# Patient Record
Sex: FEMALE | Race: BLACK OR AFRICAN AMERICAN | NOT HISPANIC OR LATINO | Employment: UNEMPLOYED | URBAN - METROPOLITAN AREA
[De-identification: names, ages, dates, MRNs, and addresses within clinical notes are randomized per-mention and may not be internally consistent; named-entity substitution may affect disease eponyms.]

---

## 2024-10-02 ENCOUNTER — HOSPITAL ENCOUNTER (EMERGENCY)
Facility: HOSPITAL | Age: 59
Discharge: HOME | End: 2024-10-02
Payer: COMMERCIAL

## 2024-10-02 ENCOUNTER — APPOINTMENT (OUTPATIENT)
Dept: RADIOLOGY | Facility: HOSPITAL | Age: 59
End: 2024-10-02
Payer: COMMERCIAL

## 2024-10-02 VITALS
RESPIRATION RATE: 16 BRPM | HEART RATE: 78 BPM | DIASTOLIC BLOOD PRESSURE: 81 MMHG | OXYGEN SATURATION: 98 % | SYSTOLIC BLOOD PRESSURE: 156 MMHG | TEMPERATURE: 97.5 F

## 2024-10-02 DIAGNOSIS — M19.039 ARTHRITIS, WRIST: Primary | ICD-10-CM

## 2024-10-02 PROCEDURE — 99284 EMERGENCY DEPT VISIT MOD MDM: CPT

## 2024-10-02 PROCEDURE — 73110 X-RAY EXAM OF WRIST: CPT | Mod: RT

## 2024-10-02 PROCEDURE — 2500000004 HC RX 250 GENERAL PHARMACY W/ HCPCS (ALT 636 FOR OP/ED): Mod: SE | Performed by: PHYSICIAN ASSISTANT

## 2024-10-02 PROCEDURE — 73130 X-RAY EXAM OF HAND: CPT | Mod: RT

## 2024-10-02 PROCEDURE — 96372 THER/PROPH/DIAG INJ SC/IM: CPT | Performed by: PHYSICIAN ASSISTANT

## 2024-10-02 PROCEDURE — 73130 X-RAY EXAM OF HAND: CPT | Mod: RIGHT SIDE | Performed by: RADIOLOGY

## 2024-10-02 PROCEDURE — 73110 X-RAY EXAM OF WRIST: CPT | Mod: RIGHT SIDE | Performed by: RADIOLOGY

## 2024-10-02 PROCEDURE — 99284 EMERGENCY DEPT VISIT MOD MDM: CPT | Performed by: PHYSICIAN ASSISTANT

## 2024-10-02 PROCEDURE — 2500000001 HC RX 250 WO HCPCS SELF ADMINISTERED DRUGS (ALT 637 FOR MEDICARE OP): Performed by: PHYSICIAN ASSISTANT

## 2024-10-02 RX ORDER — KETOROLAC TROMETHAMINE 30 MG/ML
30 INJECTION, SOLUTION INTRAMUSCULAR; INTRAVENOUS ONCE
Status: COMPLETED | OUTPATIENT
Start: 2024-10-02 | End: 2024-10-02

## 2024-10-02 RX ORDER — GABAPENTIN 300 MG/1
300 CAPSULE ORAL EVERY 8 HOURS SCHEDULED
Status: DISCONTINUED | OUTPATIENT
Start: 2024-10-02 | End: 2024-10-02 | Stop reason: HOSPADM

## 2024-10-02 RX ORDER — KETOROLAC TROMETHAMINE 10 MG/1
10 TABLET, FILM COATED ORAL EVERY 6 HOURS PRN
Qty: 12 TABLET | Refills: 0 | Status: SHIPPED | OUTPATIENT
Start: 2024-10-02 | End: 2024-10-05

## 2024-10-02 RX ADMIN — GABAPENTIN 300 MG: 300 CAPSULE ORAL at 15:15

## 2024-10-02 RX ADMIN — KETOROLAC TROMETHAMINE 30 MG: 30 INJECTION, SOLUTION INTRAMUSCULAR; INTRAVENOUS at 16:03

## 2024-10-02 ASSESSMENT — COLUMBIA-SUICIDE SEVERITY RATING SCALE - C-SSRS
6. HAVE YOU EVER DONE ANYTHING, STARTED TO DO ANYTHING, OR PREPARED TO DO ANYTHING TO END YOUR LIFE?: NO
1. IN THE PAST MONTH, HAVE YOU WISHED YOU WERE DEAD OR WISHED YOU COULD GO TO SLEEP AND NOT WAKE UP?: NO
2. HAVE YOU ACTUALLY HAD ANY THOUGHTS OF KILLING YOURSELF?: NO

## 2024-10-02 NOTE — ED PROVIDER NOTES
HPI   Chief Complaint   Patient presents with    Wrist Pain       HPI: Patient is a 59 year old female with a history of diabetes who presents to the ED for right wrist pain has been ongoing for the past 3 weeks.  Patient is complaining of pain that she states extends from the lateral right wrist and extends into her thumb and second digit.  She describes the pain as a burning/tingling sensation.  States that she does have a history of diabetes and has neuropathy in her feet.  States that this sensation feels somewhat similar.  She denies any trauma or injury.  Denies any repetitive motions of the hands.  She is right-handed.  ------------------------------------------------------------------------------------------------------------------------------------------  ROS: a ten point review of systems was performed and was negative except as per HPI.  ------------------------------------------------------------------------------------------------------------------------------------------  PMH / PSH: as per HPI, otherwise reviewed   MEDS: as per HPI, otherwise reviewed in EMR  ALLERGIES: as per HPI, otherwise reviewed in EMR  SocH:  as per HPI, otherwise reviewed in EMR  FH:  as per HPI, otherwise reviewed in EMR   ------------------------------------------------------------------------------------------------------------------------------------------  Physical Exam:  VS: As documented in the triage note and EMR flowsheet from this visit was reviewed  General: Well appearing. No acute distress.   Eyes:  Extraocular movements grossly intact. No scleral icterus.   Head: Atraumatic. Normocephalic.     Neck: No meningismus. No gross masses. Full movement through range of motion  CV: Regular rhythm. No murmurs, rubs, gallops appreciated.   Resp: Clear to auscultation bilaterally. No respiratory distress.    GI: Nontender. Soft. No masses. No rebound, rigidity or guarding.   MSK: Symmetric muscle bulk. No gross step offs or  deformities. No obvious tenderness to the right hand.   Skin: Warm, dry. No rashes  Neuro: CN II-VII intact. A&O x3. Speech fluent. Alert. Moving all extremities. Ambulates with normal gait  Psych: Appropriate mood and affect for situation  ------------------------------------------------------------------------------------------------------------------------------------------  Hospital Course / Medical Decision Making:Patient is a 59 year old female who presents to the ED for right hand pain that has been ongoing for three weeks. Patient denies any injury or repetitive motion. On examination the wrist is of normal appearance and nontender. 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 On reassessment patient was still having pain and therefore was administered Toradol. Provided a Rx for NSAIDs. Given a wrist brace per her request. As a result of the work-up, the patient was discharged home in stable condition.  They were informed of the diagnosis and instructed to come back with any concerns or worsening of condition.  Agreeable to the plan as discussed above.  Patient given the opportunity to ask questions.  All of the patient's questions were answered.                   Patient History   No past medical history on file.  No past surgical history on file.  No family history on file.  Social History     Tobacco Use    Smoking status: Not on file    Smokeless tobacco: Not on file   Substance Use Topics    Alcohol use: Not on file    Drug use: Not on file       Physical Exam   ED Triage Vitals [10/02/24 1409]   Temperature Heart Rate Respirations BP   36.4 °C (97.5 °F) 78 16 156/81      Pulse Ox Temp src Heart Rate Source Patient Position   98 % -- Monitor Sitting      BP Location FiO2 (%)     Left arm --       Physical Exam      ED Course & MDM   Diagnoses as of  10/02/24 1612   Arthritis, wrist                 No data recorded     [place] Coma Scale Score: 15 (10/02/24 1419 : CALIXTO Thompson-RACHAEL)                           Medical Decision Making      Procedure  Procedures     Daniela Coburn PA-C  10/02/24 1611

## 2024-11-19 ENCOUNTER — OFFICE VISIT (OUTPATIENT)
Dept: ORTHOPEDIC SURGERY | Facility: HOSPITAL | Age: 59
End: 2024-11-19
Payer: COMMERCIAL

## 2024-11-19 DIAGNOSIS — M25.531 RIGHT WRIST PAIN: Primary | ICD-10-CM

## 2024-11-19 PROCEDURE — 20550 NJX 1 TENDON SHEATH/LIGAMENT: CPT | Performed by: STUDENT IN AN ORGANIZED HEALTH CARE EDUCATION/TRAINING PROGRAM

## 2024-11-19 PROCEDURE — 99214 OFFICE O/P EST MOD 30 MIN: CPT | Mod: 25 | Performed by: STUDENT IN AN ORGANIZED HEALTH CARE EDUCATION/TRAINING PROGRAM

## 2024-11-19 PROCEDURE — 99204 OFFICE O/P NEW MOD 45 MIN: CPT | Performed by: STUDENT IN AN ORGANIZED HEALTH CARE EDUCATION/TRAINING PROGRAM

## 2024-11-19 ASSESSMENT — PAIN SCALES - GENERAL: PAINLEVEL_OUTOF10: 9

## 2024-11-19 ASSESSMENT — PAIN DESCRIPTION - DESCRIPTORS: DESCRIPTORS: ACHING;NUMBNESS

## 2024-11-19 ASSESSMENT — PAIN - FUNCTIONAL ASSESSMENT: PAIN_FUNCTIONAL_ASSESSMENT: 0-10

## 2024-11-21 PROCEDURE — L3809 WHFO W/O JOINTS PRE OTS: HCPCS | Performed by: STUDENT IN AN ORGANIZED HEALTH CARE EDUCATION/TRAINING PROGRAM

## 2024-11-21 NOTE — PROGRESS NOTES
CHIEF COMPLAINT: Bilateral radial wrist pain, right worse than left  DOI none  DOS: None      HISTORY OF PRESENT ILLNESS    This is a very pleasant 59-year-old right-hand-dominant female does not work, lives with her son, denies active tobacco use, positive type 2 diabetes on insulin, denies anticoagulation significant past medical history denies significant surgical history.  She is presenting as a new patient for evaluation of right greater than left radial sided wrist pain.  This been going on for over 1 month.  She denies any precipitating event or known trauma.  Pain is focal over the radial styloid.  Worse with thumb movement.  Sometimes radiates down into her thumb.  Denies any component of numbness tingling paresthesias.  She has been wearing a removable thumb spica brace to the right side without much benefit.  She has not immobilized the left side at all yet.    PHYSICAL EXAM    Extremities / Musculoskeletal:                  Bilateral upper extremities:  No gross deformity no active skin lesions open wounds no erythema edema or ecchymoses.  Focally tender palpation first dorsal compartment.  Strongly positive Eickhoff maneuver.  Nontender palpation of the base of the thumb.  No A1 pulley tenderness.  Full wrist range of motion.  Motor intact radial ulnar median AIN PIN.  Sensation tact light touch radial ulnar median.  2+ radial warm well-perfused      IMAGING / LABS / EMGs:    Right hand and right wrist x-ray series obtained on 10/2/2024 independently reviewed demonstrate normal global alignment no signs of acute displaced fracture or dislocation.  Age-appropriate degenerative changes.    No past medical history on file.    Medication Documentation Review Audit       Reviewed by Jailene Cummings MA (Medical Assistant) on 11/19/24 at 1003      Medication Order Taking? Sig Documenting Provider Last Dose Status            No Medications to Display                                   Allergies   Allergen  Reactions    Lisinopril Cough and Swelling       No past surgical history on file.      ASSESSMENT:   Bilateral de Quervain's tenosynovitis right greater than left    We discussed my assessment as well as the available treatment options.  Given the fact that her left side is treatment naïve I recommend starting with removable thumb spica bracing.  For her right side she is significantly tender despite over 1 month of compliance with bracing.  I think it be reasonable consider a corticosteroid injection into the first dorsal compartment.  We discussed the risks and benefits of this procedure including the potential for having permanent skin hypopigmentation and subcutaneous fatty atrophy.  The patient clearly understands the clinical scenario and elected to proceed with a right sided first dorsal extensor compartment injection    Procedure:  1 cc consisting of equal parts 1% lidocaine and 40 mg/mL Kenalog was injected in the first dorsal extensor compartment of the right side at the level of the radial styloid.  This was done in the usual sterile conditions.  Patient tolerated the procedure well    PLAN:   Left thumb spica fitted by [employer]  To be worn is much as possible  Right first dorsal extensor compartment injection  Continue wearing right thumb spica brace    Follow-up in: I like to see the patient back in 1 month to monitor the effects of the steroid injection as well as trochanter on the left side  XR at next visit: Yes left wrist x-ray series      Marvin Bullock M.D.    Department of Orthopaedics  Hand/Upper Extremity  Phone: [phone]  Appt. Phone: [phone]

## 2024-12-17 ENCOUNTER — APPOINTMENT (OUTPATIENT)
Dept: ORTHOPEDIC SURGERY | Facility: HOSPITAL | Age: 59
End: 2024-12-17
Payer: COMMERCIAL

## 2024-12-17 DIAGNOSIS — M25.532 WRIST PAIN, ACUTE, LEFT: ICD-10-CM

## 2025-01-21 ENCOUNTER — APPOINTMENT (OUTPATIENT)
Dept: ORTHOPEDIC SURGERY | Facility: HOSPITAL | Age: 60
End: 2025-01-21
Payer: COMMERCIAL

## 2025-01-21 DIAGNOSIS — M25.532 WRIST PAIN, ACUTE, LEFT: ICD-10-CM

## 2025-02-04 ENCOUNTER — OFFICE VISIT (OUTPATIENT)
Dept: ORTHOPEDIC SURGERY | Facility: HOSPITAL | Age: 60
End: 2025-02-04
Payer: COMMERCIAL

## 2025-02-04 ENCOUNTER — HOSPITAL ENCOUNTER (OUTPATIENT)
Dept: RADIOLOGY | Facility: HOSPITAL | Age: 60
Discharge: HOME | End: 2025-02-04
Payer: COMMERCIAL

## 2025-02-04 DIAGNOSIS — M25.532 LEFT WRIST PAIN: ICD-10-CM

## 2025-02-04 PROCEDURE — 73110 X-RAY EXAM OF WRIST: CPT | Mod: LT

## 2025-02-04 PROCEDURE — 99214 OFFICE O/P EST MOD 30 MIN: CPT | Mod: 25 | Performed by: STUDENT IN AN ORGANIZED HEALTH CARE EDUCATION/TRAINING PROGRAM

## 2025-02-04 PROCEDURE — 99214 OFFICE O/P EST MOD 30 MIN: CPT | Performed by: STUDENT IN AN ORGANIZED HEALTH CARE EDUCATION/TRAINING PROGRAM

## 2025-02-04 PROCEDURE — 20605 DRAIN/INJ JOINT/BURSA W/O US: CPT | Performed by: STUDENT IN AN ORGANIZED HEALTH CARE EDUCATION/TRAINING PROGRAM

## 2025-02-04 NOTE — PROGRESS NOTES
CHIEF COMPLAINT: Bilateral radial wrist pain, right worse than left  DOI none  DOS: None      HISTORY OF PRESENT ILLNESS    This is a very pleasant 59-year-old right-hand-dominant female does not work, lives with her son, denies active tobacco use, positive type 2 diabetes on insulin, denies anticoagulation significant past medical history denies significant surgical history.  She is presenting as a new patient for evaluation of right greater than left radial sided wrist pain.  This been going on for over 1 month.  She denies any precipitating event or known trauma.  Pain is focal over the radial styloid.  Worse with thumb movement.  Sometimes radiates down into her thumb.  Denies any component of numbness tingling paresthesias.  She has been wearing a removable thumb spica brace to the right side without much benefit.  She has not immobilized the left side at all yet.    Interval update 2/4/2025:  Patient return for scheduled follow-up.  She has done remarkably well with the right-sided injection she has had complete resolution of her symptoms.  Unfortunately despite thumb spica bracing her left radial wrist is severely painful specially with any attempted motions of her thumb.    PHYSICAL EXAM    Extremities / Musculoskeletal:                  Bilateral upper extremities:  No gross deformity no active skin lesions open wounds no erythema edema or ecchymoses.  Focally tender palpation first dorsal compartment on the left side with associated swelling.  Right side completely nontender to palpation..  Strongly positive Eickhoff maneuver on the left side, right side negative.  Nontender palpation of the base of the thumb.  No A1 pulley tenderness.  Full wrist range of motion.  Motor intact radial ulnar median AIN PIN.  Sensation tact light touch radial ulnar median.  2+ radial warm well-perfused      IMAGING / LABS / EMGs:    Right hand and right wrist x-ray series obtained on 10/2/2024 independently reviewed  demonstrate normal global alignment no signs of acute displaced fracture or dislocation.  Age-appropriate degenerative changes.    Left wrist x-ray series obtained on 2/4/2025 independent reviewed demonstrate normal global alignment no signs of acute fracture or dislocation, age-appropriate degenerative changes.      ASSESSMENT:   Bilateral de Quervain's tenosynovitis now left greater than right     We long discussion regarding my assessment.  Fortunately the prior corticosteroid injection for her right de Quervain's tenosynovitis worked very well and she has had complete durable resolution of her symptoms.  Her left side is very aggravated now despite thumb spica bracing.  I think it would be appropriate to consider a corticosteroid injection today in the office.    We discussed the risks of the corticosteroid injection including but not limited to the theoretical risk of infection.  The small risk of skin hypopigmentation and fatty atrophy which could be permanent.  We also discussed the possibility of a transient increase in blood glucose levels.  We discussed that they should monitor her blood glucose closely over the next few days.  If it is greater than 400 that she call her PCP and if it is greater than 500 they should present to the emergency department.    Stands the clinical scenario.  There are no identified barriers to understanding.  All questions were answered.  The patient elected to proceed with an injection of the left first dorsal extensor compartment for left-sided de Quervain's tenosynovitis    Procedure:  1 cc consisting of equal parts 1% lidocaine and 40 mg/mL Kenalog was injected in the first dorsal extensor compartment of the left side at the level of the radial styloid.  This was done in the usual sterile conditions.  Patient tolerated the procedure well    PLAN:   Left thumb spica as needed  No precautions on the right side     Follow-up in: Although we do not need a scheduled follow-up  visit at this time, I encouraged the patient to return to clinic if they have any concerns or issues whatsoever. The patient was provided with my office's contact information.     XR at next visit: none      Marvin Bullock M.D.    Department of Orthopaedics  Hand/Upper Extremity  Phone: [phone]  Appt. Phone: [phone]